# Patient Record
Sex: FEMALE | Race: BLACK OR AFRICAN AMERICAN | NOT HISPANIC OR LATINO | URBAN - METROPOLITAN AREA
[De-identification: names, ages, dates, MRNs, and addresses within clinical notes are randomized per-mention and may not be internally consistent; named-entity substitution may affect disease eponyms.]

---

## 2023-02-21 ENCOUNTER — EMERGENCY (EMERGENCY)
Facility: HOSPITAL | Age: 28
LOS: 0 days | Discharge: HOME | End: 2023-02-21
Attending: STUDENT IN AN ORGANIZED HEALTH CARE EDUCATION/TRAINING PROGRAM
Payer: COMMERCIAL

## 2023-02-21 VITALS
DIASTOLIC BLOOD PRESSURE: 83 MMHG | RESPIRATION RATE: 18 BRPM | HEIGHT: 64 IN | TEMPERATURE: 99 F | WEIGHT: 199.96 LBS | OXYGEN SATURATION: 98 % | HEART RATE: 95 BPM | SYSTOLIC BLOOD PRESSURE: 127 MMHG

## 2023-02-21 VITALS
OXYGEN SATURATION: 97 % | RESPIRATION RATE: 15 BRPM | TEMPERATURE: 98 F | DIASTOLIC BLOOD PRESSURE: 84 MMHG | HEART RATE: 92 BPM | SYSTOLIC BLOOD PRESSURE: 120 MMHG

## 2023-02-21 DIAGNOSIS — R50.9 FEVER, UNSPECIFIED: ICD-10-CM

## 2023-02-21 DIAGNOSIS — Z91.013 ALLERGY TO SEAFOOD: ICD-10-CM

## 2023-02-21 DIAGNOSIS — Z91.018 ALLERGY TO OTHER FOODS: ICD-10-CM

## 2023-02-21 DIAGNOSIS — R11.0 NAUSEA: ICD-10-CM

## 2023-02-21 DIAGNOSIS — N75.0 CYST OF BARTHOLIN'S GLAND: ICD-10-CM

## 2023-02-21 DIAGNOSIS — R30.0 DYSURIA: ICD-10-CM

## 2023-02-21 LAB
ALBUMIN SERPL ELPH-MCNC: 3.8 G/DL — SIGNIFICANT CHANGE UP (ref 3.3–5)
ALP SERPL-CCNC: 55 U/L — SIGNIFICANT CHANGE UP (ref 40–120)
ALT FLD-CCNC: 21 U/L — SIGNIFICANT CHANGE UP (ref 12–78)
ANION GAP SERPL CALC-SCNC: 5 MMOL/L — SIGNIFICANT CHANGE UP (ref 5–17)
AST SERPL-CCNC: 12 U/L — LOW (ref 15–37)
BASOPHILS # BLD AUTO: 0.04 K/UL — SIGNIFICANT CHANGE UP (ref 0–0.2)
BASOPHILS NFR BLD AUTO: 0.4 % — SIGNIFICANT CHANGE UP (ref 0–2)
BILIRUB SERPL-MCNC: 0.5 MG/DL — SIGNIFICANT CHANGE UP (ref 0.2–1.2)
BUN SERPL-MCNC: 6 MG/DL — LOW (ref 7–23)
CALCIUM SERPL-MCNC: 9.2 MG/DL — SIGNIFICANT CHANGE UP (ref 8.5–10.1)
CHLORIDE SERPL-SCNC: 106 MMOL/L — SIGNIFICANT CHANGE UP (ref 96–108)
CO2 SERPL-SCNC: 26 MMOL/L — SIGNIFICANT CHANGE UP (ref 22–31)
CREAT SERPL-MCNC: 0.68 MG/DL — SIGNIFICANT CHANGE UP (ref 0.5–1.3)
EGFR: 122 ML/MIN/1.73M2 — SIGNIFICANT CHANGE UP
EOSINOPHIL # BLD AUTO: 0.32 K/UL — SIGNIFICANT CHANGE UP (ref 0–0.5)
EOSINOPHIL NFR BLD AUTO: 3.3 % — SIGNIFICANT CHANGE UP (ref 0–6)
GLUCOSE SERPL-MCNC: 122 MG/DL — HIGH (ref 70–99)
HCG SERPL-ACNC: <1 MIU/ML — SIGNIFICANT CHANGE UP
HCT VFR BLD CALC: 37.4 % — SIGNIFICANT CHANGE UP (ref 34.5–45)
HGB BLD-MCNC: 11.9 G/DL — SIGNIFICANT CHANGE UP (ref 11.5–15.5)
IMM GRANULOCYTES NFR BLD AUTO: 0.3 % — SIGNIFICANT CHANGE UP (ref 0–0.9)
LACTATE SERPL-SCNC: 0.9 MMOL/L — SIGNIFICANT CHANGE UP (ref 0.7–2)
LYMPHOCYTES # BLD AUTO: 1.47 K/UL — SIGNIFICANT CHANGE UP (ref 1–3.3)
LYMPHOCYTES # BLD AUTO: 15.3 % — SIGNIFICANT CHANGE UP (ref 13–44)
MCHC RBC-ENTMCNC: 25.8 PG — LOW (ref 27–34)
MCHC RBC-ENTMCNC: 31.8 G/DL — LOW (ref 32–36)
MCV RBC AUTO: 81.1 FL — SIGNIFICANT CHANGE UP (ref 80–100)
MONOCYTES # BLD AUTO: 0.73 K/UL — SIGNIFICANT CHANGE UP (ref 0–0.9)
MONOCYTES NFR BLD AUTO: 7.6 % — SIGNIFICANT CHANGE UP (ref 2–14)
NEUTROPHILS # BLD AUTO: 7.03 K/UL — SIGNIFICANT CHANGE UP (ref 1.8–7.4)
NEUTROPHILS NFR BLD AUTO: 73.1 % — SIGNIFICANT CHANGE UP (ref 43–77)
NRBC # BLD: 0 /100 WBCS — SIGNIFICANT CHANGE UP (ref 0–0)
PLATELET # BLD AUTO: 341 K/UL — SIGNIFICANT CHANGE UP (ref 150–400)
POTASSIUM SERPL-MCNC: 4 MMOL/L — SIGNIFICANT CHANGE UP (ref 3.5–5.3)
POTASSIUM SERPL-SCNC: 4 MMOL/L — SIGNIFICANT CHANGE UP (ref 3.5–5.3)
PROT SERPL-MCNC: 8 GM/DL — SIGNIFICANT CHANGE UP (ref 6–8.3)
RBC # BLD: 4.61 M/UL — SIGNIFICANT CHANGE UP (ref 3.8–5.2)
RBC # FLD: 14.1 % — SIGNIFICANT CHANGE UP (ref 10.3–14.5)
SODIUM SERPL-SCNC: 137 MMOL/L — SIGNIFICANT CHANGE UP (ref 135–145)
WBC # BLD: 9.62 K/UL — SIGNIFICANT CHANGE UP (ref 3.8–10.5)
WBC # FLD AUTO: 9.62 K/UL — SIGNIFICANT CHANGE UP (ref 3.8–10.5)

## 2023-02-21 PROCEDURE — 99284 EMERGENCY DEPT VISIT MOD MDM: CPT

## 2023-02-21 RX ORDER — KETOROLAC TROMETHAMINE 30 MG/ML
15 SYRINGE (ML) INJECTION ONCE
Refills: 0 | Status: DISCONTINUED | OUTPATIENT
Start: 2023-02-21 | End: 2023-02-21

## 2023-02-21 RX ORDER — OXYCODONE AND ACETAMINOPHEN 5; 325 MG/1; MG/1
1 TABLET ORAL
Qty: 8 | Refills: 0
Start: 2023-02-21 | End: 2023-02-22

## 2023-02-21 RX ORDER — ACETAMINOPHEN 500 MG
975 TABLET ORAL ONCE
Refills: 0 | Status: COMPLETED | OUTPATIENT
Start: 2023-02-21 | End: 2023-02-21

## 2023-02-21 RX ADMIN — Medication 15 MILLIGRAM(S): at 09:08

## 2023-02-21 NOTE — ED ADULT TRIAGE NOTE - CHIEF COMPLAINT QUOTE
Pt walked in the ED, c/o ovarian cyst infection, abdominal pain. PMH of ovarian cyst for 5 years, migraine. Pt states" I was diagnosed with oviran cyst infection in [place] last Friday, but they did not have the cathter so they only did the incision to drain ". Pt endorses  the pain came back since Sunday and it is getting worse now. Pt endorses chills, low grade fever. Pt denied cp, sob, n/v, dizziness, vaginal bleeding or discharge.

## 2023-02-21 NOTE — ED PROVIDER NOTE - CLINICAL SUMMARY MEDICAL DECISION MAKING FREE TEXT BOX
27 y.o. female presents to the ED w/ c/o infected Bartholin cyst x 4 days.  Endorsing low grade fever w/ chills, nausea, dysuria, and pain.  Initially seen in [place] ED and treated w/ I&D and Bactrim bid, but reports symptoms have persisted.  Exam revealed R. sided Bartholin cyst w/ erythema and +ttp.  Possible infection.  Will obtain labs given systemic symptoms, UA, and attempt I&D w/ culture. 27 y.o. female presents to the ED w/ c/o infected Bartholin cyst x 4 days.  Endorsing low grade fever w/ chills, nausea, dysuria, and pain.  Initially seen in [place] ED and treated w/ I&D and Bactrim bid, but reports symptoms have persisted.  Exam revealed R. sided Bartholin cyst w/ erythema and +ttp.  Possible infection but erythema and edema minimal.  Will obtain labs given systemic symptoms, UA, and attempt I&D w/ culture.

## 2023-02-21 NOTE — ED PROVIDER NOTE - NS ED ATTENDING STATEMENT MOD
I have seen and examined this patient and fully participated in the care of this patient as the teaching attending.  The service was shared with the TISH.  I reviewed and verified the documentation and independently performed the documented:

## 2023-02-21 NOTE — ED PROVIDER NOTE - PHYSICAL EXAMINATION
GEN: Pt in NAD, A&O x4  PSYCH: Affect appropriate.  HEAD: Head NCAT, Neck supple FROM.  EYES: Sclera white w/o injection.   ENT: No nasal d/c. MMM. uvula midline, no oral lesions.  RESP: CTA b/l, no wheezes, rales, or rhonchi.   CARDIAC: RRR, clear distinct S1, S2, no appreciable murmurs.  ABD: Abdomen soft, non-tender. No CVAT b/l.  PELVIC: Chaperone: Domenico/Maria Eugenia. +R. bartholin cyst w/ erythema, +ttp.  External genitalia otherwise unremarkable, no lesions, no vaginal bleeding or DC.  VASC: 2+ radial and dorsalis pedis pulses b/l.  SKIN: No rashes, lacerations, or ecchymoses on the trunk.

## 2023-02-21 NOTE — ED PROVIDER NOTE - NSFOLLOWUPINSTRUCTIONS_ED_ALL_ED_FT
You were seen in the emergency department for ___.  You had _____.  The resutls were discussed with you by your provider and a copy has been included in your discharge paperwork.  Please read all additional instructions below, and follow-up with all providers as directed.    1) Schedule a follow-up appointment with the GYN, Dr. Ochoa.    2) Continue to take all medications as prescribed.    3) Rest and stay hydrated. Pain can be managed with Acetaminophen (aka Tylenol) and Ibuprofen (aka Motrin or Advil) over the counter as directed.    4) Return to the ER for any new or worsening symptoms.      Please read all attached patient information.      Bartholin's Cyst       A Bartholin's cyst is a fluid-filled sac that forms as a result of a blockage along the tube (duct) of the Bartholin's gland. Bartholin's glands are small glands in the folds of skin around the vaginal opening (labia). These glands produce fluid to moisten or lubricate the outside of the vagina during sex.    A cyst that is not large or infected may not cause any problems or require treatment. If the cyst gets infected with bacteria, it is called a Bartholin's abscess. An abscess may cause symptoms such as pain and swelling and is more likely to require treatment.      What are the causes?    This condition may be caused by a blocked Bartholin's gland duct. These ducts can become blocked due to natural buildup of fluid and oils. Bacteria inside of the cyst can cause infection.    In many cases, the cause is not known.      What are the signs or symptoms?    Symptoms may include:  •A bulge or lump on the labia, near the lower opening of the vagina.      •Discomfort or pain. This may get worse during sex or when walking.      •Redness, swelling, or fluid draining from the area. These may be signs of an abscess.      How severe your symptoms are depends on the size of your cyst and whether it is infected. Infection causes symptoms to get more severe.      How is this diagnosed?    This condition may be diagnosed based on:  •Your symptoms and medical history.      •A physical exam to check for swelling in your vaginal area. You may lie on your back on an exam table and have your feet placed into footrests for the exam.      •Blood tests to check for infections.      •Removal of a fluid sample from the cyst or abscess (biopsy) for testing.      You may work with a health care provider who specializes in women's health (gynecologist) for diagnosis and treatment.      How is this treated?    If your cyst is small, not infected, and not causing symptoms, you may not need treatment. These cysts often go away on their own, with home care such as hot baths or warm compresses.    If you have a large cyst or an abscess, treatment may include:  •Antibiotic medicine.    •A procedure to drain the fluid inside the cyst or abscess. These procedures involve making an incision in the cyst or abscess so that the fluid drains out, and then one of the following may be done:  •A small, thin tube (catheter) may be placed inside the cyst or abscess so that it does not close and fill up with fluid again (fistulization). The catheter will be removed at a follow-up visit.      •The edges of the incision may be stitched to your skin so that the cyst or abscess stays open (marsupialization). This allows it to continue to drain and not fill up with fluid again.        If you have cysts or abscesses that keep returning (recurring) and have required incision and drainage multiple times, your health care provider may talk with you about surgery to remove the Bartholin's gland.      Follow these instructions at home:    Medicines     •Take over-the-counter and prescription medicines only as told by your health care provider.      •If you were prescribed an antibiotic medicine, take it as told by your health care provider. Do not stop taking the antibiotic even if your condition improves.      Managing pain and swelling     •Try sitz baths to help with pain and swelling. A sitz bath is a warm water bath in which the water only comes up to your hips and should cover your buttocks. You may take sitz baths several times a day.    •Apply heat to the affected area as often as needed. Use the heat source that your health care provider recommends, such as a moist heat pack or a heating pad.   •Place a towel between your skin and the heat source.       •Leave the heat on for 20–30 minutes.       •Remove the heat if your skin turns bright red. This is especially important if you are unable to feel pain, heat, or cold. You may have a greater risk of getting burned. Do not fall asleep with the heating pad in place.        General instructions     •If your cyst or abscess was drained, follow instructions from your health care provider about how to take care of your wound. Use feminine pads as needed to absorb any drainage.      • Do not push on or squeeze your cyst.      • Do not have sex until the cyst has gone away or your wound from drainage has healed.    •Take these steps to help prevent a Bartholin's cyst from returning and to prevent other Bartholin's cysts from developing:  •Take a bath or shower once a day. Clean your vaginal area with mild soap and water when you bathe.      •Practice safe sex to prevent STIs. Talk with your health care provider about how to prevent STIs and which forms of birth control (contraception) may be best for you.        •Keep all follow-up visits. This is important.        Contact a health care provider if:    •You have a fever.      •You develop increasing redness, swelling, or pain around your cyst.      •You have fluid, blood, pus, or a bad smell coming from your cyst.      •You have a cyst that gets larger or comes back.        Summary    •A Bartholin's cyst is a fluid-filled sac that forms as a result of a blockage along the duct of the Bartholin's gland.      •If your cyst is small, not infected, and not causing symptoms, you may not need any treatment.      •If you have a large cyst or an abscess, your health care provider may perform a procedure to drain the fluid.      •If you have cysts or abscesses that keep returning (recurring) and have required incision and drainage multiple times, your health care provider may talk with you about surgery to remove the Bartholin's gland.      This information is not intended to replace advice given to you by your health care provider. Make sure you discuss any questions you have with your health care provider.

## 2023-02-21 NOTE — ED PROVIDER NOTE - OBJECTIVE STATEMENT
27 y.o. female presents to the ED w/ c/o infected Bartholin cyst.  Has had bartholin cyst x 5 years w/o issue until 4 days ago when it became swollen and tender.  Pt reports she had low grade fevers (temp=100F) and chills w/ mild nausea, no vomiting.  Was seen in ED in [place] where she had an I&D and was started on Bactrim.  She states pain and tenderness has persisted along w/ dysuria despite taking abx x 4 days.  Continues to endorse low grade fevers and chlils.  Pt did not endorse ovarian cysts as stated in triage note.  No vomiting, chest pain, dizziness, headache, SOB, abdominal pain, vaginal d/c or diarrhea.

## 2023-02-21 NOTE — ED PROVIDER NOTE - PROGRESS NOTE DETAILS
MARCELINO Del Cid, NP: Pt asking for catheter specifically.  No word catheters in ED and area too small for other catheters wed MARCELINO Del Cid NP: Pt asking for catheter specifically given prior failed I&D.  She states they  have worked for her in the past.  Pt reports her gyn does not have any in office.  No word catheters in ED and area too small for other catheters.  GYN consulted. MARCELINO Del Cid NP:  Per Dr. Ochoa, next available appt 2/23.  He can ask  to contact pt in an hour and look out for anything sooner.  In the meantime he recommended, pain control and warm compresses.  S/w pt who became very upset and stated she was in pain.  Offered addt'l pain medication for control but pt refused and is requesting to leave. MARCELINO Del Cid NP:  Per Dr. Ochoa, next available appt 2/23.  He can ask  to contact pt in an hour and look out for anything sooner.  In the meantime he recommended, pain control and warm compresses.  S/w pt who became very upset and stated she was in pain.  Offered addt'l pain medication for control but pt refused and is requesting to leave.  Pt did not endorse worsening pain to Attending MD who reports pt was resting comfortably. Dr ortega called back to see if the patient can be seen in th MARCELINO Del Cid NP:  Per Dr. Ochoa, next available appt 2/23.  He can ask  to contact pt in an hour and look out for anything sooner.  In the meantime he recommended, pain control and warm compresses.  S/w pt who became very upset and stated she was in pain.  Offered addt'l pain medication for control but pt refused and is requesting to leave.  Does not want to give urine.  Pt did not endorse worsening pain to Attending MD who reports pt was resting comfortably.

## 2023-02-21 NOTE — ED PROVIDER NOTE - PATIENT PORTAL LINK FT
You can access the FollowMyHealth Patient Portal offered by [facility] by registering at the following website: [email or web address]. By joining [employer]’s FollowMyHealth portal, you will also be able to view your health information using other applications (apps) compatible with our system.

## 2023-02-21 NOTE — ED ADULT NURSE NOTE - OBJECTIVE STATEMENT
cinthia friday cysts x 5 years problematic statying friday , got it drained no pmh pt presents to ed a&ox4 breathing spont/unlabored to ra c/o fever on friday cysts x 5 years problematic starting friday , got it drained . however the pain continues no pmh

## 2023-02-21 NOTE — ED PROVIDER NOTE - CARE PROVIDER_API CALL
Pastor Worthy  OBSTETRICS AND GYNECOLOGY  [address] [place] [address]  Phone: [phone]  Fax: [phone]  Scheduled Appointment: 02/23/2023

## 2024-01-21 ENCOUNTER — EMERGENCY (EMERGENCY)
Facility: HOSPITAL | Age: 29
LOS: 0 days | Discharge: HOME | End: 2024-01-21
Attending: STUDENT IN AN ORGANIZED HEALTH CARE EDUCATION/TRAINING PROGRAM
Payer: COMMERCIAL

## 2024-01-21 VITALS
TEMPERATURE: 99 F | HEART RATE: 70 BPM | RESPIRATION RATE: 18 BRPM | DIASTOLIC BLOOD PRESSURE: 86 MMHG | SYSTOLIC BLOOD PRESSURE: 121 MMHG | OXYGEN SATURATION: 100 %

## 2024-01-21 VITALS
WEIGHT: 229.94 LBS | HEIGHT: 64 IN | HEART RATE: 76 BPM | RESPIRATION RATE: 18 BRPM | OXYGEN SATURATION: 98 % | TEMPERATURE: 98 F

## 2024-01-21 DIAGNOSIS — M79.601 PAIN IN RIGHT ARM: ICD-10-CM

## 2024-01-21 DIAGNOSIS — R07.89 OTHER CHEST PAIN: ICD-10-CM

## 2024-01-21 DIAGNOSIS — M54.2 CERVICALGIA: ICD-10-CM

## 2024-01-21 DIAGNOSIS — Z91.013 ALLERGY TO SEAFOOD: ICD-10-CM

## 2024-01-21 DIAGNOSIS — Z91.018 ALLERGY TO OTHER FOODS: ICD-10-CM

## 2024-01-21 LAB
ALBUMIN SERPL ELPH-MCNC: 3.3 G/DL — SIGNIFICANT CHANGE UP (ref 3.3–5)
ALP SERPL-CCNC: 48 U/L — SIGNIFICANT CHANGE UP (ref 40–120)
ALT FLD-CCNC: 24 U/L — SIGNIFICANT CHANGE UP (ref 12–78)
ANION GAP SERPL CALC-SCNC: 6 MMOL/L — SIGNIFICANT CHANGE UP (ref 5–17)
APTT BLD: 29.3 SEC — SIGNIFICANT CHANGE UP (ref 24.5–35.6)
AST SERPL-CCNC: 17 U/L — SIGNIFICANT CHANGE UP (ref 15–37)
BASOPHILS # BLD AUTO: 0.02 K/UL — SIGNIFICANT CHANGE UP (ref 0–0.2)
BASOPHILS NFR BLD AUTO: 0.5 % — SIGNIFICANT CHANGE UP (ref 0–2)
BILIRUB SERPL-MCNC: 0.2 MG/DL — SIGNIFICANT CHANGE UP (ref 0.2–1.2)
BUN SERPL-MCNC: 6 MG/DL — LOW (ref 7–23)
CALCIUM SERPL-MCNC: 8.3 MG/DL — LOW (ref 8.5–10.1)
CHLORIDE SERPL-SCNC: 107 MMOL/L — SIGNIFICANT CHANGE UP (ref 96–108)
CK MB BLD-MCNC: <1.4 % — SIGNIFICANT CHANGE UP (ref 0–3.5)
CK MB CFR SERPL CALC: <1 NG/ML — SIGNIFICANT CHANGE UP (ref 0.5–3.6)
CK SERPL-CCNC: 70 U/L — SIGNIFICANT CHANGE UP (ref 26–192)
CO2 SERPL-SCNC: 28 MMOL/L — SIGNIFICANT CHANGE UP (ref 22–31)
CREAT SERPL-MCNC: 0.61 MG/DL — SIGNIFICANT CHANGE UP (ref 0.5–1.3)
D DIMER BLD IA.RAPID-MCNC: 201 NG/ML DDU — SIGNIFICANT CHANGE UP
EGFR: 124 ML/MIN/1.73M2 — SIGNIFICANT CHANGE UP
EOSINOPHIL # BLD AUTO: 0.09 K/UL — SIGNIFICANT CHANGE UP (ref 0–0.5)
EOSINOPHIL NFR BLD AUTO: 2.2 % — SIGNIFICANT CHANGE UP (ref 0–6)
GLUCOSE SERPL-MCNC: 103 MG/DL — HIGH (ref 70–99)
HCG SERPL-ACNC: <1 MIU/ML — SIGNIFICANT CHANGE UP
HCT VFR BLD CALC: 35.3 % — SIGNIFICANT CHANGE UP (ref 34.5–45)
HGB BLD-MCNC: 11.5 G/DL — SIGNIFICANT CHANGE UP (ref 11.5–15.5)
IMM GRANULOCYTES NFR BLD AUTO: 0.2 % — SIGNIFICANT CHANGE UP (ref 0–0.9)
INR BLD: 1.01 RATIO — SIGNIFICANT CHANGE UP (ref 0.85–1.18)
LYMPHOCYTES # BLD AUTO: 1.38 K/UL — SIGNIFICANT CHANGE UP (ref 1–3.3)
LYMPHOCYTES # BLD AUTO: 34.4 % — SIGNIFICANT CHANGE UP (ref 13–44)
MCHC RBC-ENTMCNC: 26.5 PG — LOW (ref 27–34)
MCHC RBC-ENTMCNC: 32.6 G/DL — SIGNIFICANT CHANGE UP (ref 32–36)
MCV RBC AUTO: 81.3 FL — SIGNIFICANT CHANGE UP (ref 80–100)
MONOCYTES # BLD AUTO: 0.43 K/UL — SIGNIFICANT CHANGE UP (ref 0–0.9)
MONOCYTES NFR BLD AUTO: 10.7 % — SIGNIFICANT CHANGE UP (ref 2–14)
NEUTROPHILS # BLD AUTO: 2.08 K/UL — SIGNIFICANT CHANGE UP (ref 1.8–7.4)
NEUTROPHILS NFR BLD AUTO: 52 % — SIGNIFICANT CHANGE UP (ref 43–77)
NRBC # BLD: 0 /100 WBCS — SIGNIFICANT CHANGE UP (ref 0–0)
PLATELET # BLD AUTO: 251 K/UL — SIGNIFICANT CHANGE UP (ref 150–400)
POTASSIUM SERPL-MCNC: 3.3 MMOL/L — LOW (ref 3.5–5.3)
POTASSIUM SERPL-SCNC: 3.3 MMOL/L — LOW (ref 3.5–5.3)
PROT SERPL-MCNC: 7.5 GM/DL — SIGNIFICANT CHANGE UP (ref 6–8.3)
PROTHROM AB SERPL-ACNC: 12.1 SEC — SIGNIFICANT CHANGE UP (ref 9.5–13)
RBC # BLD: 4.34 M/UL — SIGNIFICANT CHANGE UP (ref 3.8–5.2)
RBC # FLD: 13.4 % — SIGNIFICANT CHANGE UP (ref 10.3–14.5)
SODIUM SERPL-SCNC: 141 MMOL/L — SIGNIFICANT CHANGE UP (ref 135–145)
TROPONIN I, HIGH SENSITIVITY RESULT: 18.8 NG/L — SIGNIFICANT CHANGE UP
WBC # BLD: 4.01 K/UL — SIGNIFICANT CHANGE UP (ref 3.8–10.5)
WBC # FLD AUTO: 4.01 K/UL — SIGNIFICANT CHANGE UP (ref 3.8–10.5)

## 2024-01-21 PROCEDURE — 93010 ELECTROCARDIOGRAM REPORT: CPT

## 2024-01-21 PROCEDURE — 71045 X-RAY EXAM CHEST 1 VIEW: CPT | Mod: 26

## 2024-01-21 PROCEDURE — 99284 EMERGENCY DEPT VISIT MOD MDM: CPT

## 2024-01-21 NOTE — ED PROVIDER NOTE - PATIENT PORTAL LINK FT
You can access the FollowMyHealth Patient Portal offered by [facility] by registering at the following website: [email or web address]. By joining [employer]’s FollowMyHealth portal, you will also be able to view your health information using other applications (apps) compatible with our system.

## 2024-01-21 NOTE — ED ADULT TRIAGE NOTE - CHIEF COMPLAINT QUOTE
pt c/o right side chest pain and shortness of breath since yesterday. denies cough, fever or chills. recently travel from [place], arrived on Monday. no history.

## 2024-01-21 NOTE — ED PROVIDER NOTE - CLINICAL SUMMARY MEDICAL DECISION MAKING FREE TEXT BOX
28 year old female with no past medical history presents today c/o right sided neck tightness radiating to the right arm pain and right chest, rated 5/10, pt recently returned from [place] on Monday, states that it was an 8.5 hour direct flight, denies leg edema or calf pains, in two weeks she will be travelling again and wanted to get chest (-) nausea or vomiting (-) flu like symptoms, on exam pt is well appearing and in no respiratory distress, able to speak in complet and clear sentences, exam benign, plan is to obtain labs, including d dimer, ekg, cardiac monitoring, cxr, possible ct to r/o PE if d dimer is elevated, will reassess and dispo 28 year old female with no past medical history presents today c/o right sided neck tightness radiating to the right arm pain and right chest, rated 5/10, pt recently returned from [place] on Monday, states that it was an 8.5 hour direct flight, denies leg edema or calf pains, in two weeks she will be travelling again and wanted to get chest (-) nausea or vomiting (-) flu like symptoms, on exam pt is well appearing and in no respiratory distress, able to speak in complet and clear sentences, exam benign, plan is to obtain labs, including d dimer, ekg, cardiac monitoring, cxr, possible ct to r/o PE if d dimer is elevated, will reassess and dispo    labs reviewed, d dimer negative, cxr negative    pt advised to take tylenol or motrin forr pain, her symptoms likely due to a strain, follow up with her pmd

## 2024-01-21 NOTE — ED ADULT NURSE NOTE - OBJECTIVE STATEMENT
28 yr old female AOx4. C/o right sided Cp x2 days. Dull pain radiating to neck and R shoulder. 3/10. Pt recently traveled from [place]. No PMH. Pt denies SOB, N/V/D, fever/chills, h/a, dizziness. No dyspnea w/ exertion. NSR on monitor. 100% O2 sat on RA. Currently menstruating.

## 2024-01-21 NOTE — ED PROVIDER NOTE - OBJECTIVE STATEMENT
28 year old female with no past medical history presents today c/o right sided neck tightness radiating to the right arm pain and right chest, rated 5/10, pt recently returned from [place] on Monday, states that it was an 8.5 hour direct flight, denies leg edema or calf pains, in two weeks she will be travelling again and wanted to get chest (-) nausea or vomiting (-) flu like symptoms

## 2024-07-26 ENCOUNTER — EMERGENCY (EMERGENCY)
Facility: HOSPITAL | Age: 29
LOS: 1 days | Discharge: HOME | End: 2024-07-26
Attending: STUDENT IN AN ORGANIZED HEALTH CARE EDUCATION/TRAINING PROGRAM | Admitting: STUDENT IN AN ORGANIZED HEALTH CARE EDUCATION/TRAINING PROGRAM
Payer: COMMERCIAL

## 2024-07-26 VITALS
RESPIRATION RATE: 18 BRPM | DIASTOLIC BLOOD PRESSURE: 62 MMHG | OXYGEN SATURATION: 100 % | SYSTOLIC BLOOD PRESSURE: 134 MMHG | HEART RATE: 88 BPM | TEMPERATURE: 99 F

## 2024-07-26 VITALS
OXYGEN SATURATION: 100 % | DIASTOLIC BLOOD PRESSURE: 82 MMHG | SYSTOLIC BLOOD PRESSURE: 112 MMHG | HEART RATE: 70 BPM | RESPIRATION RATE: 18 BRPM | TEMPERATURE: 98 F

## 2024-07-26 LAB
ALBUMIN SERPL ELPH-MCNC: 4.8 G/DL — SIGNIFICANT CHANGE UP (ref 3.3–5)
ALP SERPL-CCNC: 56 U/L — SIGNIFICANT CHANGE UP (ref 40–120)
ALT FLD-CCNC: 13 U/L — SIGNIFICANT CHANGE UP (ref 4–33)
ANION GAP SERPL CALC-SCNC: 11 MMOL/L — SIGNIFICANT CHANGE UP (ref 7–14)
APTT BLD: 32.4 SEC — SIGNIFICANT CHANGE UP (ref 24.5–35.6)
AST SERPL-CCNC: 16 U/L — SIGNIFICANT CHANGE UP (ref 4–32)
BASOPHILS # BLD AUTO: 0.04 K/UL — SIGNIFICANT CHANGE UP (ref 0–0.2)
BASOPHILS NFR BLD AUTO: 0.8 % — SIGNIFICANT CHANGE UP (ref 0–2)
BILIRUB SERPL-MCNC: 0.2 MG/DL — SIGNIFICANT CHANGE UP (ref 0.2–1.2)
BUN SERPL-MCNC: 9 MG/DL — SIGNIFICANT CHANGE UP (ref 7–23)
CALCIUM SERPL-MCNC: 9 MG/DL — SIGNIFICANT CHANGE UP (ref 8.4–10.5)
CHLORIDE SERPL-SCNC: 100 MMOL/L — SIGNIFICANT CHANGE UP (ref 98–107)
CO2 SERPL-SCNC: 26 MMOL/L — SIGNIFICANT CHANGE UP (ref 22–31)
CREAT SERPL-MCNC: 0.69 MG/DL — SIGNIFICANT CHANGE UP (ref 0.5–1.3)
EGFR: 120 ML/MIN/1.73M2 — SIGNIFICANT CHANGE UP
EOSINOPHIL # BLD AUTO: 0.29 K/UL — SIGNIFICANT CHANGE UP (ref 0–0.5)
EOSINOPHIL NFR BLD AUTO: 5.9 % — SIGNIFICANT CHANGE UP (ref 0–6)
GLUCOSE SERPL-MCNC: 100 MG/DL — HIGH (ref 70–99)
HCG SERPL-ACNC: <1 MIU/ML — SIGNIFICANT CHANGE UP
HCT VFR BLD CALC: 40.6 % — SIGNIFICANT CHANGE UP (ref 34.5–45)
HGB BLD-MCNC: 12.8 G/DL — SIGNIFICANT CHANGE UP (ref 11.5–15.5)
IANC: 2.33 K/UL — SIGNIFICANT CHANGE UP (ref 1.8–7.4)
IMM GRANULOCYTES NFR BLD AUTO: 0.2 % — SIGNIFICANT CHANGE UP (ref 0–0.9)
INR BLD: 0.95 RATIO — SIGNIFICANT CHANGE UP (ref 0.85–1.18)
LYMPHOCYTES # BLD AUTO: 1.88 K/UL — SIGNIFICANT CHANGE UP (ref 1–3.3)
LYMPHOCYTES # BLD AUTO: 38.5 % — SIGNIFICANT CHANGE UP (ref 13–44)
MCHC RBC-ENTMCNC: 26.2 PG — LOW (ref 27–34)
MCHC RBC-ENTMCNC: 31.5 GM/DL — LOW (ref 32–36)
MCV RBC AUTO: 83.2 FL — SIGNIFICANT CHANGE UP (ref 80–100)
MONOCYTES # BLD AUTO: 0.33 K/UL — SIGNIFICANT CHANGE UP (ref 0–0.9)
MONOCYTES NFR BLD AUTO: 6.8 % — SIGNIFICANT CHANGE UP (ref 2–14)
NEUTROPHILS # BLD AUTO: 2.33 K/UL — SIGNIFICANT CHANGE UP (ref 1.8–7.4)
NEUTROPHILS NFR BLD AUTO: 47.8 % — SIGNIFICANT CHANGE UP (ref 43–77)
NRBC # BLD: 0 /100 WBCS — SIGNIFICANT CHANGE UP (ref 0–0)
NRBC # FLD: 0 K/UL — SIGNIFICANT CHANGE UP (ref 0–0)
PLATELET # BLD AUTO: 309 K/UL — SIGNIFICANT CHANGE UP (ref 150–400)
POTASSIUM SERPL-MCNC: 4.3 MMOL/L — SIGNIFICANT CHANGE UP (ref 3.5–5.3)
POTASSIUM SERPL-SCNC: 4.3 MMOL/L — SIGNIFICANT CHANGE UP (ref 3.5–5.3)
PROT SERPL-MCNC: 8 G/DL — SIGNIFICANT CHANGE UP (ref 6–8.3)
PROTHROM AB SERPL-ACNC: 10.7 SEC — SIGNIFICANT CHANGE UP (ref 9.5–13)
RBC # BLD: 4.88 M/UL — SIGNIFICANT CHANGE UP (ref 3.8–5.2)
RBC # FLD: 13.2 % — SIGNIFICANT CHANGE UP (ref 10.3–14.5)
SODIUM SERPL-SCNC: 137 MMOL/L — SIGNIFICANT CHANGE UP (ref 135–145)
TROPONIN T, HIGH SENSITIVITY RESULT: <6 NG/L — SIGNIFICANT CHANGE UP
WBC # BLD: 4.88 K/UL — SIGNIFICANT CHANGE UP (ref 3.8–10.5)
WBC # FLD AUTO: 4.88 K/UL — SIGNIFICANT CHANGE UP (ref 3.8–10.5)

## 2024-07-26 PROCEDURE — 99285 EMERGENCY DEPT VISIT HI MDM: CPT

## 2024-07-26 PROCEDURE — 0042T: CPT | Mod: MC

## 2024-07-26 PROCEDURE — 70496 CT ANGIOGRAPHY HEAD: CPT | Mod: 26,MC

## 2024-07-26 PROCEDURE — 70498 CT ANGIOGRAPHY NECK: CPT | Mod: 26,MC

## 2024-07-26 RX ORDER — ACETAMINOPHEN 325 MG
1000 TABLET ORAL ONCE
Refills: 0 | Status: COMPLETED | OUTPATIENT
Start: 2024-07-26 | End: 2024-07-26

## 2024-07-26 NOTE — ED PROVIDER NOTE - CLINICAL SUMMARY MEDICAL DECISION MAKING FREE TEXT BOX
28-year-old female with no previous past medical history presents emergency department for 1 day of right-sided facial numbness. 1 week fo R sided HA with some feeling of being off balance. Activated as code stroke by triage. + decreased sensation of R side of face. No other focal neuro deficits. Differentials include but not limited to TIA, CVA, complex migraine. Plan for labs, CTs. Will get neuro recs. Dispo pending imaging and recs.

## 2024-07-26 NOTE — ED PROVIDER NOTE - NSFOLLOWUPINSTRUCTIONS_ED_ALL_ED_FT
YOU WERE SEEN FOR right facial numbness    YOU HAD blood work and CT scan done.   You can take ibuprofen 400mg every 6 to 8 hours and Tylenol 1000mg every 6 to 8 hours as needed for pain.     FOLLOW UP WITH a neurologist.     RETURN TO THE EMERGENCY DEPARTMENT FOR worsening headache, numbness/tingling, weakness, difficulty with speech, or any new/concerning symptoms.

## 2024-07-26 NOTE — ED PROVIDER NOTE - PATIENT PORTAL LINK FT
You can access the FollowMyHealth Patient Portal offered by [facility] by registering at the following website: [email or web address]. By joining [employer]’s FollowMyHealth portal, you will also be able to view your health information using other applications (apps) compatible with our system.

## 2024-07-26 NOTE — ED ADULT NURSE NOTE - NSFALLUNIVINTERV_ED_ALL_ED
Bed/Stretcher in lowest position, wheels locked, appropriate side rails in place/Call bell, personal items and telephone in reach/Instruct patient to call for assistance before getting out of bed/chair/stretcher/Non-slip footwear applied when patient is off stretcher/[place] to call system/Physically safe environment - no spills, clutter or unnecessary equipment/Purposeful proactive rounding/Room/bathroom lighting operational, light cord in reach

## 2024-07-26 NOTE — ED ADULT NURSE NOTE - OBJECTIVE STATEMENT
Stroke Nurse: SILVESTREAGUSTO ARVIZU is a 28y (1995) wo/man with a PMHx significant for pre-diabetes presents with several days of head "heaviness" accompanied by occasional blurry vision and imbalance. Shortly after waking up, this AM right side of face was numb. Report given to primary RN Wayne.

## 2024-07-26 NOTE — ED PROVIDER NOTE - PROGRESS NOTE DETAILS
Jarred Davey, PGY3 - visual acuity 20/10 bilaterally. IOP L 14 R 15.6 normal. Jarred Davey, PGY3 - patient feeling much better. Will dc w/ neuro f/u. no headache at this time. Patient verbalized understanding and agrees with the plan.

## 2024-07-26 NOTE — STROKE CODE NOTE - NIH STROKE SCALE DATE
26-Jul-2024 14:22 Normal vision: sees adequately in most situations; can see medication labels, newsprint

## 2024-07-26 NOTE — CONSULT NOTE ADULT - ASSESSMENT
Patient JOB ARVIZU is a 28y (1995) right handed woman with no significant PMHx except for prediabetes who presents to the ED with right sided facial numbness that began today 07/26 at 0800. Pt went to bed at 2100 on 7/25 (LKW) when she was in her USOH and then awoke this morning feeling strange and her face was slightly numb. According to the patient, she has had eye twitching on and off for over a year. However, her symptoms became worse this past week. She began to have R eye twitching, pressure behind the R eye, blurry vision intermittently on the right, and some balance issues with "tripping" over herself. Pt was activated as a stroke code and assessed by the Neurology team. NIHSS 1,(right v1, v2 distribution sensory decreased) LKW 2100 7/25. preMRS 0. Pt denies any fevers, chills, chest pain, trouble breathing, abdominal pain, nausea/vomiting, diarrhea/constipation, dysuria, hematuria. She does endorse occasional headaches.       Impression  Dizziness, right facial numbness and eye twitching localizing to peripheral vs central cause. Likely peripheral etiology given clinical exam and presentation.     Recommendations:   [] Acute headache management:   [] 1st line (can be given together, Q8HR PRN for HA): Metoclopramide 10mg IV, Benadryl 25mg IV, Toradol 30mg IVP  ***Can replace Reglan with Prochlorperazine 10mg IV  ***Can use Ondansetron 8mg IV if DA-R antagonist contraindicated  ***Consider MgSO4 1g if migraine with aura (CI in NM dz, renal impairment, AVB, pregnancy)  [] 2nd line: Solumedrol 250mg IV x1.   [] 3rd line: Depakote 500mg IV x1 (caution in pregnant pts, liver dysfunction, mitochondrial disorders)  [] Darken/quiet room  [] Encourage avoidance of common migraine triggers (artificial sweeteners, food preservative (MSG), aged cheese, skipping meals, change in wake/sleep cycle and intense physical exertion)  [] Encourage lifestyle changes that help migraine: physical exercise, fixed meal time, fixed sleep/wake cycle, avoid smoking and highly caffeinated products  [] Consider opthalmology evaluation  [] Patient has MRI Brain appointment on Thursday 8/2, please keep   [] Neurochecks, vitals q4h  [] Fall and aspiration precautions  [] PT/OT/SLP/CM  [] Diet: As per primary  [] DVT prophylaxis: as per primary  [] Smoking cessation education not needed - non-smoker  [] Please document MRS on discharge    NO tenecteplase d/t out of appropriate time window  NO thrombectomy d/t no evidence of LVO     Patient/plan d/w Stroke fellow, Dr. Valverde, under the supervision of attending vascular neurologist Dr. Libman. To be seen by attending in the AM with attestation to follow. Recommendations were relayed directly to ED. Note delayed d/t emergent patient care. Patient JOB ARVIZU is a 28y (1995) right handed woman with no significant PMHx except for prediabetes who presents to the ED with right sided facial numbness that began today 07/26 at 0800. Pt went to bed at 2100 on 7/25 (LKW) when she was in her USOH and then awoke this morning feeling strange and her face was slightly numb. According to the patient, she has had eye twitching on and off for over a year. However, her symptoms became worse this past week. She began to have R eye twitching, pressure behind the R eye, blurry vision intermittently on the right, and some balance issues with "tripping" over herself. Pt was activated as a stroke code and assessed by the Neurology team. NIHSS 1,(right v1, v2 distribution sensory decreased) LKW 2100 7/25. preMRS 0. Pt denies any fevers, chills, chest pain, trouble breathing, abdominal pain, nausea/vomiting, diarrhea/constipation, dysuria, hematuria. She does endorse occasional headaches.       Impression  Dizziness, right facial numbness and eye twitching localizing to peripheral vs central cause. Likely peripheral etiology given clinical exam and presentation.     Recommendations:   [] Acute headache management:   [] 1st line (can be given together, Q8HR PRN for HA): Metoclopramide 10mg IV, Benadryl 25mg IV, Toradol 30mg IVP  ***Can replace Reglan with Prochlorperazine 10mg IV  ***Can use Ondansetron 8mg IV if DA-R antagonist contraindicated  ***Consider MgSO4 1g if migraine with aura (CI in NM dz, renal impairment, AVB, pregnancy)  [] 2nd line: Solumedrol 250mg IV x1.   [] 3rd line: Depakote 500mg IV x1 (caution in pregnant pts, liver dysfunction, mitochondrial disorders)  [] Darken/quiet room  [] Encourage avoidance of common migraine triggers (artificial sweeteners, food preservative (MSG), aged cheese, skipping meals, change in wake/sleep cycle and intense physical exertion)  [] Encourage lifestyle changes that help migraine: physical exercise, fixed meal time, fixed sleep/wake cycle, avoid smoking and highly caffeinated products  [] Consider opthalmology evaluation  [] Patient has MRI Brain appointment on Thursday 8/2, please keep   [] Please followup with neurology outpatient ([address] [place] [address])  [] If patient improves after migraine cocktail, and symptoms resolve, ok to be discharged home.   [] Neurochecks, vitals q4h  [] Fall and aspiration precautions  [] PT/OT/SLP/CM  [] Diet: As per primary  [] DVT prophylaxis: as per primary  [] Smoking cessation education not needed - non-smoker  [] Please document MRS on discharge    NO tenecteplase d/t out of appropriate time window  NO thrombectomy d/t no evidence of LVO     Patient/plan d/w Stroke fellow, Dr. Valverde, under the supervision of attending vascular neurologist Dr. Libman. To be seen by attending in the AM with attestation to follow. Recommendations were relayed directly to ED. Note delayed d/t emergent patient care.

## 2024-07-26 NOTE — ED PROVIDER NOTE - ATTENDING CONTRIBUTION TO CARE
Derrick Olson DO:  patient seen and evaluated with the resident.  I was present for key portions of the History & Physical, and I agree with the Impression & Plan. 27 yo f pw ha. Patient reports 1 week of symptoms.  Reports today developed right-sided facial numbness, contacted PCP who recommended MRI.  Due to length of wait to obtain MRI patient came for further evaluation.  Reports last known normal was 2145 last night when she went to bed.  Woke up with symptoms.  Denies N/V, F/C, cough, congestion.  Reports mild right-sided blurry vision. Denies recent travel, sick contacts, neck manipulation.  Denies trauma.  Patient arrives HDS, well-appearing.  Code stroke initiated upon arrival to ED.

## 2024-07-26 NOTE — ED ADULT TRIAGE NOTE - CHIEF COMPLAINT QUOTE
Pt reporting to the ED for R side facial numbness. LNK last night before going to bed at 9 pm. No facial droop, speech is clear, no limb drift, gait is steady. MD Olson called for stroke eval, code stroke called.

## 2024-07-26 NOTE — ED PROVIDER NOTE - PHYSICAL EXAMINATION
Constitutional: VS reviewed. Alert and orientedx3, well appearing, no apparent distress  HEENT: Atraumatic, EOMI, PERRL   CV: RRR  Lungs: Clear and equal bilaterally, no wheezes, rales or crackles  Abdomen: Soft, nondistended, nontender  MSK: No deformities  Skin: Warm and dry. As visualized no rashes, lesions, bruising or erythema  Neuro: Strength 5/5 in all extremities. Sensation in extremities intact. Mild decrease in sensation in R side of face in V1 and V2 distribution. Normal finger to nose and heel to shin. Steady gait. No pronator drift, facial droop or slurred speech.   Lymph: No pitting edema in extremities.

## 2024-07-26 NOTE — ED PROVIDER NOTE - NSFOLLOWUPCLINICS_GEN_ALL_ED_FT
[facility]  Neurology  [address] [place] [address] - [address] [place] [address]  Phone: [phone]  Fax:   Follow Up Time: 4-6 Days

## 2024-07-26 NOTE — ED PROVIDER NOTE - OBJECTIVE STATEMENT
28-year-old female with no previous past medical history presents emergency department for 1 day of right-sided facial numbness.  Patient was activated as code stroke in triage and brought directly to CT scan.  Patient endorses 1 week of right-sided headache and at times feeling off balance while walking.  Patient also endorsing some blurry vision over the last 1 week.  Patient states she woke up this morning with right-sided facial numbness.  Last known normal was 9 PM last night when she went to bed.  Patient denies fevers, chills, chest pain, trouble breathing, abdominal pain, nausea/vomiting, diarrhea/constipation, dysuria, hematuria.

## 2024-07-26 NOTE — CONSULT NOTE ADULT - SUBJECTIVE AND OBJECTIVE BOX
Neurology - Consult Note    -  Spectra: [id number] ([facility]), [phone] ([facility])  -    HPI: Patient JOB ARVIZU is a 28y (1995) wo/man with a PMHx significant for ***      Review of Systems:  INCOMPLETE   CONSTITUTIONAL: No fevers or chills  EYES AND ENT: No visual changes or no throat pain   NECK: No pain or stiffness  RESPIRATORY: No hemoptysis or shortness of breath  CARDIOVASCULAR: No chest pain or palpitations  GASTROINTESTINAL: No melena or hematochezia  GENITOURINARY: No dysuria or hematuria  NEUROLOGICAL: +As stated in HPI above  SKIN: No itching, burning, rashes, or lesions   All other review of systems is negative unless indicated above.    Allergies:  fish (Anaphylaxis)  strawberry (Anaphylaxis)  No Known Drug Allergies      PMHx/PSHx/Family Hx: As above, otherwise see below   No pertinent past medical history        Social Hx:  No current use of tobacco, alcohol, or illicit drugs  Lives with ***    Medications:  MEDICATIONS  (STANDING):    MEDICATIONS  (PRN):      Vitals:  T(C): 36.8 (07-26-24 @ 14:13), Max: 36.8 (07-26-24 @ 14:13)  HR: 70 (07-26-24 @ 14:13) (70 - 70)  BP: 112/82 (07-26-24 @ 14:13) (112/82 - 112/82)  RR: 18 (07-26-24 @ 14:13) (18 - 18)  SpO2: 100% (07-26-24 @ 14:13) (100% - 100%)    Physical Examination: INCOMPLETE  General - NAD  Cardiovascular - Peripheral pulses palpable, no edema  Eyes - Fundoscopy with flat, sharp optic discs and no hemorrhage or exudates; Fundoscopy not well visualized; Fundoscopy not performed due to safety precautions in the setting of the COVID-19 pandemic    Neurologic Exam:  Mental status - Awake, Alert, Oriented to person, place, and time. Speech fluent, repetition and naming intact. Follows simple and complex commands. Attention/concentration, recent and remote memory (including registration and recall), and fund of knowledge intact    Cranial nerves - PERRLA, VFF, EOMI, face sensation (V1-V3) intact b/l, facial strength intact without asymmetry b/l, hearing intact b/l, palate with symmetric elevation, trapezius OR sternocleidomastiod 5/5 strength b/l, tongue midline on protrusion with full lateral movement    Motor - Normal bulk and tone throughout. No pronator drift.  Strength testing            Deltoid      Biceps      Triceps     Wrist Extension    Wrist Flexion     Interossei         R            5                 5               5                     5                              5                        5                 5  L             5                 5               5                     5                              5                        5                 5              Hip Flexion    Hip Extension    Knee Flexion    Knee Extension    Dorsiflexion    Plantar Flexion  R              5                           5                       5                           5                            5                          5  L              5                           5                        5                           5                            5                          5    Sensation - Light touch/temperature OR pain/vibration intact throughout    DTR's -             Biceps      Triceps     Brachioradialis      Patellar    Ankle    Toes/plantar response  R             2+             2+                  2+                       2+            2+                 Down  L              2+             2+                 2+                        2+           2+                 Down    Coordination - Finger to Nose intact b/l. No tremors appreciated    Gait and station - Normal casual gait. Romberg (-)    Labs:          CAPILLARY BLOOD GLUCOSE  112 (26 Jul 2024 14:32)      POCT Blood Glucose.: 112 mg/dL (26 Jul 2024 14:17)          CSF:                  Radiology:     Neurology - Consult Note    -  Spectra: [phone] ([facility]), [phone] ([facility])  -    HPI: Patient JOB ARVIZU is a 28y (1995) right handed woman with no significant PMHx except for prediabetes who presents to the ED with right sided facial numbness that began today 07/26 at 0800. Pt went to bed at 2100 on 7/25 (LKW) when she was in her USOH and then awoke this morning feeling strange and her face was slightly numb. According to the patient, she has had eye twitching on and off for over a year. However, her symptoms became worse this past week. She began to have R eye twitching, pressure behind the R eye, blurry vision intermittently on the right, and some balance issues with "tripping" over herself. Pt was activated as a stroke code and assessed by the Neurology team. NIHSS 1,(right v1, v2 distribution sensory decreased) LKW 2100 7/25. preMRS 0. Pt denies any fevers, chills, chest pain, trouble breathing, abdominal pain, nausea/vomiting, diarrhea/constipation, dysuria, hematuria. She does endorse occasional headaches.       Review of Systems:   CONSTITUTIONAL: No fevers or chills  EYES AND ENT: No visual changes or no throat pain   NECK: No pain or stiffness  RESPIRATORY: No hemoptysis or shortness of breath  CARDIOVASCULAR: No chest pain or palpitations  GASTROINTESTINAL: No melena or hematochezia  GENITOURINARY: No dysuria or hematuria  NEUROLOGICAL: +As stated in HPI above  SKIN: No itching, burning, rashes, or lesions   All other review of systems is negative unless indicated above.    Allergies:  fish (Anaphylaxis)  strawberry (Anaphylaxis)  No Known Drug Allergies      PMHx/PSHx/Family Hx: As above, otherwise see below   No pertinent past medical history        Social Hx:  No current use of tobacco, alcohol, or illicit drugs    Medications:  MEDICATIONS  (STANDING):    MEDICATIONS  (PRN):      Vitals:  T(C): 36.8 (07-26-24 @ 14:13), Max: 36.8 (07-26-24 @ 14:13)  HR: 70 (07-26-24 @ 14:13) (70 - 70)  BP: 112/82 (07-26-24 @ 14:13) (112/82 - 112/82)  RR: 18 (07-26-24 @ 14:13) (18 - 18)  SpO2: 100% (07-26-24 @ 14:13) (100% - 100%)    Physical Examination:   General - NAD  Cardiovascular - Peripheral pulses palpable, no edema    Constitutional: well-developed, well-nourished, well-groomed  Eyes: ophthalmoscopic exam deferred secondary to COVID-19 pandemic  Cardiovascular: no swelling, warm-to-touch    Neurological Examination:    - Mental Status: Eyes open, attends to examiner; oriented to person, age, month, year, location, and situation; speech is fluent with intact naming, intact repetition, and follows 1-step and 3-step mid-line crossing commands; good overall fund of knowledge (aware of current events, relevant past history, and vocabulary appropriate for level of education); immediate recall is 3/3 words and delayed recall is 3/3 words at 5 minutes; able to spell WORLD backwards and recite the days of the week in reverse.    - Cranial Nerves:  II: Visual fields are full to confrontation; pupils are equal, round, and reactive to light   III, IV, VI: Extraocular movements are intact without nystagmus  V: Facial sensation is decreased in V1,V2 distribution on the right  VII: Face is symmetric with normal eye closure and smile  VIII: Hearing is intact to conversation  IX, X: Uvula is midline and soft palate rises symmetrically  XI: Shoulder shrug intact  XII: Tongue protrudes in the midline    - Motor/Strength Testing:  - No drifts x 4 extremities.                                   Right           Left  Deltoid                     5                 5  Biceps                      5                 5  Triceps                     5                 5  Wrist Ext (radial)       5                 5  Hand                  5                 5    Hip Flex                   5                  5  Knee Ext	      5                  5  Dorsiflex                  5                  5  Plantarflex               5                  5    - There is no pronator drift.   - Normal muscle bulk and tone throughout.    - Reflexes:   Bicep (C5/C6):                  R 2+ --- L 2+   Brachioradialis (C5/C6) :   R 2+ --- L 2+   Patella (L3/L4) :                 R 2+ --- L 2+   Ankle (S1) :                       R 2+ --- L 2+     - Plant responses down bilaterally.    - Sensory: Intact throughout to light touch x 4 extremities.  - Coordination: Finger-nose-finger intact without ataxia or dysmetria.    - Gait: Normal steps, base, arm swing, and turning.     Labs:          CAPILLARY BLOOD GLUCOSE  112 (26 Jul 2024 14:32)      POCT Blood Glucose.: 112 mg/dL (26 Jul 2024 14:17)          CSF          Radiology:  IMPRESSION:    CT HEAD:  Gray/white matter differentiation is preserved. No acute intracranial   hemorrhage.    Findings were discussed with physician assistant Fidel  7/26/2024 2:41   PM by Dr. Huang with read back confirmation.    CT PERFUSION:  Technical limitations: None.    Core infarction: 0 ml  Penumbra / tissue at risk for active ischemia: 0 ml    CTA NECK:  No evidence of significant stenosis or occlusion.    CTA HEAD:  No large vessel occlusion, significant stenosis or vascular abnormality   identified.